# Patient Record
Sex: MALE | Race: WHITE | NOT HISPANIC OR LATINO | Employment: FULL TIME | URBAN - METROPOLITAN AREA
[De-identification: names, ages, dates, MRNs, and addresses within clinical notes are randomized per-mention and may not be internally consistent; named-entity substitution may affect disease eponyms.]

---

## 2017-08-22 ENCOUNTER — TRANSCRIBE ORDERS (OUTPATIENT)
Dept: PAIN MEDICINE | Facility: HOSPITAL | Age: 47
End: 2017-08-22

## 2017-08-22 DIAGNOSIS — M54.5 LOW BACK PAIN, UNSPECIFIED BACK PAIN LATERALITY, UNSPECIFIED CHRONICITY, WITH SCIATICA PRESENCE UNSPECIFIED: Primary | ICD-10-CM

## 2017-09-05 ENCOUNTER — HOSPITAL ENCOUNTER (OUTPATIENT)
Dept: PAIN MEDICINE | Facility: HOSPITAL | Age: 47
Discharge: HOME | End: 2017-09-05
Attending: ORTHOPAEDIC SURGERY | Admitting: ORTHOPAEDIC SURGERY

## 2017-09-05 ENCOUNTER — ANESTHESIA (OUTPATIENT)
Dept: PAIN MEDICINE | Facility: HOSPITAL | Age: 47
End: 2017-09-05

## 2017-09-05 ENCOUNTER — HOSPITAL ENCOUNTER (OUTPATIENT)
Dept: GENERAL RADIOLOGY | Facility: HOSPITAL | Age: 47
Discharge: HOME | End: 2017-09-05

## 2017-09-05 ENCOUNTER — ANESTHESIA EVENT (OUTPATIENT)
Dept: PAIN MEDICINE | Facility: HOSPITAL | Age: 47
End: 2017-09-05

## 2017-09-05 VITALS
RESPIRATION RATE: 16 BRPM | BODY MASS INDEX: 34.65 KG/M2 | TEMPERATURE: 98.5 F | OXYGEN SATURATION: 97 % | SYSTOLIC BLOOD PRESSURE: 140 MMHG | HEART RATE: 76 BPM | HEIGHT: 74 IN | DIASTOLIC BLOOD PRESSURE: 94 MMHG | WEIGHT: 270 LBS

## 2017-09-05 DIAGNOSIS — M54.5 LOW BACK PAIN, UNSPECIFIED BACK PAIN LATERALITY, UNSPECIFIED CHRONICITY, WITH SCIATICA PRESENCE UNSPECIFIED: ICD-10-CM

## 2017-09-05 DIAGNOSIS — R52 PAIN: ICD-10-CM

## 2017-09-05 PROCEDURE — 25010000002 METHYLPREDNISOLONE PER 80 MG: Performed by: ANESTHESIOLOGY

## 2017-09-05 PROCEDURE — C1755 CATHETER, INTRASPINAL: HCPCS

## 2017-09-05 PROCEDURE — 77003 FLUOROGUIDE FOR SPINE INJECT: CPT

## 2017-09-05 RX ORDER — METHYLPREDNISOLONE ACETATE 80 MG/ML
80 INJECTION, SUSPENSION INTRA-ARTICULAR; INTRALESIONAL; INTRAMUSCULAR; SOFT TISSUE ONCE
Status: COMPLETED | OUTPATIENT
Start: 2017-09-05 | End: 2017-09-05

## 2017-09-05 RX ORDER — SODIUM CHLORIDE 0.9 % (FLUSH) 0.9 %
1-10 SYRINGE (ML) INJECTION AS NEEDED
Status: DISCONTINUED | OUTPATIENT
Start: 2017-09-05 | End: 2017-09-06 | Stop reason: HOSPADM

## 2017-09-05 RX ORDER — LISINOPRIL 20 MG/1
20 TABLET ORAL DAILY
COMMUNITY
End: 2021-09-01

## 2017-09-05 RX ORDER — FENTANYL CITRATE 50 UG/ML
50 INJECTION, SOLUTION INTRAMUSCULAR; INTRAVENOUS AS NEEDED
Status: DISCONTINUED | OUTPATIENT
Start: 2017-09-05 | End: 2017-09-06 | Stop reason: HOSPADM

## 2017-09-05 RX ORDER — CALCIUM POLYCARBOPHIL 625 MG 625 MG/1
625 TABLET ORAL DAILY
COMMUNITY
End: 2022-11-10

## 2017-09-05 RX ORDER — MIDAZOLAM HYDROCHLORIDE 1 MG/ML
1 INJECTION INTRAMUSCULAR; INTRAVENOUS AS NEEDED
Status: DISCONTINUED | OUTPATIENT
Start: 2017-09-05 | End: 2017-09-06 | Stop reason: HOSPADM

## 2017-09-05 RX ORDER — LIDOCAINE HYDROCHLORIDE 10 MG/ML
1 INJECTION, SOLUTION INFILTRATION; PERINEURAL ONCE AS NEEDED
Status: DISCONTINUED | OUTPATIENT
Start: 2017-09-05 | End: 2017-09-06 | Stop reason: HOSPADM

## 2017-09-05 RX ADMIN — METHYLPREDNISOLONE ACETATE 80 MG: 80 INJECTION, SUSPENSION INTRA-ARTICULAR; INTRALESIONAL; INTRAMUSCULAR; SOFT TISSUE at 14:04

## 2017-09-05 NOTE — ANESTHESIA PROCEDURE NOTES
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  Needle Type:Tuohy  Needle Gauge:20 G  Aspiration:negative  Test Dose:negative  Medications:  Depomedrol:80 mg  Preservative Free Saline:2mL  Comments:Lumbar epidural steroid injection was performed under fluoroscopic guidance.  L 5 S1 interspace was entered by loss resistance technique.  There was minimal exacerbation his pain upon entering the face.  He was exceptionally uncomfortable just on the positioning however.  80 mg of Depo-Medrol were diluted in slowly injected without exacerbation.  The needle was withdrawn.  No return red blood cells or cerebral spinal fluid upon entering the space.  Post Assessment:  Pt Tolerance:patient tolerated the procedure well with no apparent complications  Complications:no

## 2017-09-05 NOTE — H&P
[facility]    History and Physical    Patient Name: Dameon Butler  :  1970  MRN:  [id number]  Date of Admission: 2017    Subjective     Patient is a 47 y.o. male presents with chief complaint of acute low back, hips: bilateral and buttock pain.  Onset of symptoms was abrupt starting several months ago.  Symptoms are associated/aggravated by nothing in particular or activity. Symptoms improve with nothing     He's had intermittent low back pain for many years.  He's even had epidural steroid done about 15 years ago.  He was doing well until several weeks ago when he had acute onset of low back pain while lifting and twisting.  The pain was immediate and severe.  Since that time he is had a great deal of discomfort is essentially unremitting.  In general is getting worse.  There is low back pain as well as bilateral hip pain.  The left seems to hurt worse than the right.  He has an MRI which shows mild wedging of the T12 and L1 vertebrae which appears chronic as well as disc bulging at L4 5 L1-2 and L3 4 there is recess stenosis at the L4 5 level due to his bulging and facet hypertrophy.    The following portions of the patients history were reviewed and updated as appropriate: current medications, allergies, past medical history, past surgical history, past family history, past social history and problem list                Objective     Past Medical History:   Past Medical History:   Diagnosis Date   • Hypertension    • Low back pain      Past Surgical History: History reviewed. No pertinent surgical history.  Family History: History reviewed. No pertinent family history.  Social History:   Social History   Substance Use Topics   • Smoking status: Never Smoker   • Smokeless tobacco: Never Used   • Alcohol use Yes      Comment: OCC       Vital Signs Range for the last 24 hours  Temperature: Temp:  [36.9 °C (98.5 °F)] 36.9 °C (98.5 °F)   Temp Source: Temp src: Oral   BP: BP: (117)/(73) 117/73   Pulse:  "Heart Rate:  [84] 84   Respirations: Resp:  [16] 16   SPO2: SpO2:  [96 %] 96 %   O2 Amount (l/min):     O2 Devices O2 Device: room air   Weight: Weight:  [270 lb (122 kg)] 270 lb (122 kg)     Flowsheet Rows         First Filed Value    Admission Height  74\" (188 cm) Documented at 09/05/2017 1346    Admission Weight  270 lb (122 kg) Documented at 09/05/2017 1346          --------------------------------------------------------------------------------    Current Outpatient Prescriptions   Medication Sig Dispense Refill   • calcium polycarbophil (FIBERCON) 625 MG tablet Take 625 mg by mouth Daily.     • lisinopril (PRINIVIL,ZESTRIL) 20 MG tablet Take 20 mg by mouth Daily.       Current Facility-Administered Medications   Medication Dose Route Frequency Provider Last Rate Last Dose   • fentaNYL citrate (PF) (SUBLIMAZE) injection 50 mcg  50 mcg Intravenous PRN Mehrdad Vila MD       • lidocaine (XYLOCAINE) 1 % injection 1 mL  1 mL Intradermal Once PRN Mehradd Vila MD       • methylPREDNISolone acetate (DEPO-medrol) injection 80 mg  80 mg Intra-articular Once Mehrdad Vila MD       • midazolam (VERSED) injection 1 mg  1 mg Intravenous PRN Mehrdad Vila MD       • sodium chloride 0.9 % flush 1-10 mL  1-10 mL Intravenous PRN Mehrdad Vila MD           --------------------------------------------------------------------------------  Assessment/Plan      Anesthesia Evaluation     no history of anesthetic complications:         Airway   Mallampati: II  TM distance: >3 FB  Neck ROM: full  no difficulty expected  Dental      Pulmonary - negative pulmonary ROS and normal exam   (-) wheezes  Cardiovascular   Exercise tolerance: good (4-7 METS)    Rhythm: regular    (+) hypertension,   (-) murmur    PE comment: Dorsal pedal pulses are palpable bilaterally.  Lower extremity is are warm without edema.    Neuro/Psych- negative ROS    PE Comment: No obvious sensory deficits were noted  GI/Hepatic/Renal/Endo    "   Musculoskeletal     (+) back pain, Straight Leg Test,       PE comment: Straight leg raise is exquisitely positive bilaterally.  He feels like there is weakness in his leg whenever his pain is bad.  It's difficult to assess this due to guarding and pain.  Abdominal   (+) obese,    Substance History      OB/GYN          Other                                   Diagnosis and Plan    Treatment Plan  ASA 2      Procedures: Lumbar Epidural Steroid Injection(LESI), With fluoroscopy,           I discussed with him the risks and benefits.  He understands that he may have exacerbation of his pain initially.  He understands it may take 24-48 hours for steroids to work.  He understands it may be no relief.    Diagnosis     * Lumbar spinal stenosis [M48.06]     * Lumbar neuritis [M54.16]     * Lumbago [M54.5]